# Patient Record
Sex: MALE | Race: WHITE
[De-identification: names, ages, dates, MRNs, and addresses within clinical notes are randomized per-mention and may not be internally consistent; named-entity substitution may affect disease eponyms.]

---

## 2018-07-21 ENCOUNTER — HOSPITAL ENCOUNTER (EMERGENCY)
Dept: HOSPITAL 36 - ER | Age: 58
Discharge: NURSING, REHAB OR LONG-TERM CARE | End: 2018-07-21
Payer: MEDICARE

## 2018-07-21 DIAGNOSIS — K21.9: ICD-10-CM
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
## 2018-07-21 DIAGNOSIS — F03.90: ICD-10-CM
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
## 2018-07-21 DIAGNOSIS — S00.93XA: ICD-10-CM

## 2018-07-21 DIAGNOSIS — Z91.011: ICD-10-CM

## 2018-07-21 DIAGNOSIS — S01.21XA: Primary | ICD-10-CM

## 2018-07-21 LAB
ALBUMIN SERPL-MCNC: 3.6 GM/DL (ref 4.2–5.5)
ALBUMIN/GLOB SERPL: 1.2 {RATIO} (ref 1–1.8)
ALP SERPL-CCNC: 61 U/L (ref 34–104)
ALT SERPL-CCNC: 9 U/L (ref 7–52)
ANION GAP SERPL CALC-SCNC: 9.9 MMOL/L (ref 7–16)
AST SERPL-CCNC: 13 U/L (ref 13–39)
BASOPHILS # BLD AUTO: 0.1 TH/CUMM (ref 0–0.2)
BASOPHILS NFR BLD AUTO: 0.7 % (ref 0–2)
BILIRUB SERPL-MCNC: 0.3 MG/DL (ref 0.3–1)
BUN SERPL-MCNC: 30 MG/DL (ref 7–25)
CALCIUM SERPL-MCNC: 9.2 MG/DL (ref 8.6–10.3)
CHLORIDE SERPL-SCNC: 98 MEQ/L (ref 98–107)
CO2 SERPL-SCNC: 31.8 MEQ/L (ref 21–31)
CREAT SERPL-MCNC: 1.5 MG/DL (ref 0.7–1.3)
EOSINOPHIL # BLD AUTO: 0.2 TH/CMM (ref 0.1–0.4)
EOSINOPHIL NFR BLD AUTO: 2.9 % (ref 0–5)
ERYTHROCYTE [DISTWIDTH] IN BLOOD BY AUTOMATED COUNT: 14.1 % (ref 11.5–20)
GLOBULIN SER-MCNC: 3.1 GM/DL
GLUCOSE SERPL-MCNC: 113 MG/DL (ref 70–105)
HCT VFR BLD CALC: 33.1 % (ref 41–60)
HGB BLD-MCNC: 11.3 GM/DL (ref 12–16)
LYMPHOCYTE AB SER FC-ACNC: 1.5 TH/CMM (ref 1.5–3)
LYMPHOCYTES NFR BLD AUTO: 17.6 % (ref 20–50)
MCH RBC QN AUTO: 28.7 PG (ref 26–30)
MCHC RBC AUTO-ENTMCNC: 34.3 PG (ref 28–36)
MCV RBC AUTO: 83.7 FL (ref 80–99)
MONOCYTES # BLD AUTO: 0.6 TH/CMM (ref 0.3–1)
MONOCYTES NFR BLD AUTO: 7.6 % (ref 2–10)
NEUTROPHILS # BLD: 5.9 TH/CMM (ref 1.8–8)
NEUTROPHILS NFR BLD AUTO: 71.2 % (ref 40–80)
PLATELET # BLD: 227 TH/CMM (ref 150–400)
PMV BLD AUTO: 6.7 FL
POTASSIUM SERPL-SCNC: 3.7 MEQ/L (ref 3.5–5.1)
RBC # BLD AUTO: 3.95 MIL/CMM (ref 4.3–5.7)
SODIUM SERPL-SCNC: 136 MEQ/L (ref 136–145)
WBC # BLD AUTO: 8.3 TH/CMM (ref 4.8–10.8)

## 2018-07-21 PROCEDURE — 70450 CT HEAD/BRAIN W/O DYE: CPT

## 2018-07-21 PROCEDURE — 96372 THER/PROPH/DIAG INJ SC/IM: CPT

## 2018-07-21 PROCEDURE — 80164 ASSAY DIPROPYLACETIC ACD TOT: CPT

## 2018-07-21 PROCEDURE — 84443 ASSAY THYROID STIM HORMONE: CPT

## 2018-07-21 PROCEDURE — 36415 COLL VENOUS BLD VENIPUNCTURE: CPT

## 2018-07-21 PROCEDURE — 90715 TDAP VACCINE 7 YRS/> IM: CPT

## 2018-07-21 PROCEDURE — 96374 THER/PROPH/DIAG INJ IV PUSH: CPT

## 2018-07-21 PROCEDURE — 80299 QUANTITATIVE ASSAY DRUG: CPT

## 2018-07-21 PROCEDURE — 85025 COMPLETE CBC W/AUTO DIFF WBC: CPT

## 2018-07-21 PROCEDURE — 84484 ASSAY OF TROPONIN QUANT: CPT

## 2018-07-21 PROCEDURE — 99285 EMERGENCY DEPT VISIT HI MDM: CPT

## 2018-07-21 PROCEDURE — 80053 COMPREHEN METABOLIC PANEL: CPT

## 2018-07-21 NOTE — ED PHYSICIAN CHART
ED Chief Complaint/HPI





- Patient Information


Date Seen:: 07/21/18


Time Seen:: 16:36


Chief Complaint:: HEAD AND FACE TRAUMA


History of Present Illness:: 





THIS IS A 59 YO MALE CHRONICALLY ILL PATIENT WHO HAS PSYCHOSIS WITH SEIZURES.  

HE FELL FROM A WHEELCHAIR ABOUT TWO HOURS AGO AND INJURED HIS HEAD AND FACE.   

HE DID NOT LOSE CONSCIOUSNESS AND ONLY AN OPEN WOUND OF THE NOSE. 


Allergies:: 


 Allergies











Allergy/AdvReac Type Severity Reaction Status Date / Time


 


lactulose Allergy Unknown  Verified 07/21/18 16:41











Vitals:: 


 Vital Signs - 8 hr











  07/21/18





  16:34


 


Temp 99.3 F


 


HR 92


 


RR 18


 


/93


 


O2 Sat % 96











Historian:: EMS, Medical Records


Review:: Nurse's Note Reviewed, Transfer documents Reviewed





ED Review of Systems





- Review of Systems


General/Constitutional: No fever, No chills, No weight loss, No weakness, No 

diaphoresis, No edema, No loss of appetite, Other (THE PATIENT IS NOT ABLE TO 

GIVE A REVIEW OF SYSTEMS.)


Skin: No skin lesions, No rash, No bruising


Head: No headache, No light-headedness


Eyes: No loss of vision, No pain, No diplopia


ENT: No earache, No nasal drainage, No sore throat, No tinnitus


Neck: No neck pain, No swelling, No thyromegaly, No stiffness, No mass noted


Cardio Vascular: No chest pain, No palpitations, No PND, No orthopnea, No edema


Pulmonary: No SOB, No cough, No sputum, No wheezing


GI: No nausea, No vomiting, No diarrhea, No pain, No melena, No hematochezia, 

No constipation, No hematemesis


G/U: No dysuria, No frequency, No hematuria


Musculoskeletal: No bone or joint pain, No back pain, No muscle pain


Endocrine: No polyuria, No polydipsia


Psychiatric: No prior psych history, No depression, No anxiety, No suicidal 

ideation


Hematopoietic: No bruising, No lymphadenopathy


Allergic/Immuno: No urticaria, No angioedema


Neurological: No syncope, No focal symptoms, No weakness, No paresthesia, No 

headache, No seizure, No dizziness, No confusion, No vertigo





ED Past Medical History





- Past Medical History


Obtainable: Yes


Past Medical History: PUD/GERD, Seizures, Dementia


Family History: None


Social History: Non Smoker, No Alcohol, No Drug Use, Care Facility


Surgical History: None


Psychiatricy History: Schizophrenia, Dementia


Medication: Reviewed





Family Medical History





- Family Member


  ** Father


History Unknown: Yes





ED Physical Exam





- Physical Examination


General/Constitutional: Awake, Well-developed, well-nourished, Alert, No 

distress, GCS 15, Non-toxic appearing, Ambulatory


Head: Atraumatic


Eyes: Lids, conjuctiva normal, PERRL, EOMI


Skin: Nl inspection, No rash, No skin lesions, No ecchymosis, Well hydrated, No 

lymphadenopathy


ENMT: External ears, nose nl, Nasal exam nl, Lips, teeth, gums nl


Other ENMT comments:: 





LACERATION OF THE LEFT SIDE OF THE NOSE THAT IS CLOSED AND NOT BLEEDING WITH 

STRI-STRIPS IN PLACE.


Neck: Nontender, Full ROM w/o pain, No JVD, No nuchal rigidity, No bruit, No 

mass, No stridor


Respiratory: Nl effort/Exclusion, Clear to Auscultation, No Wheeze/Rhonchi/Rales


Cardio Vascular: RRR, No murmur, gallop, rubs, NL S1 S2


GI: No tenderness/rebounding/guarding, No organomegaly, No hernia, Normal BS's, 

Nondistended, No mass/bruits, No McBurney tenderness


: No CVA tenderness


Extremities: No tenderness or effusion, Full ROM, normal strength in all 

extremities, No edema, Normal digits & nails


Neuro/Psych: Alert/oriented, DTR's symmetric, Normal sensory exam, Normal motor 

strength, Judgement/insight normal, Mood normal, Normal gait, No focal deficits


Misc: Normal back, No paraspinal tenderness





ED Labs/Radiology/EKG Results





- Lab Results


Results: 





 Abnormal Lab Results











  07/21/18 07/21/18 07/21/18





  16:42 16:42 16:42


 


WBC  8.3  


 


RBC  3.95 L  


 


Hgb  11.3 L  


 


Hct  33.1 L  


 


MCV  83.7  


 


MCH  28.7  


 


MCHC Differential  34.3  


 


RDW  14.1  


 


Plt Count  227  


 


MPV  6.7  


 


Neutrophils %  71.2  


 


Lymphocytes %  17.6 L  


 


Monocytes %  7.6  


 


Eosinophils %  2.9  


 


Basophils %  0.7  


 


Sodium   136 


 


Potassium   3.7 


 


Chloride   98 


 


Carbon Dioxide   31.8 H 


 


Anion Gap   9.9 


 


BUN   30 H 


 


Creatinine   1.5 H 


 


Est GFR ( Amer)   > 60.0 


 


Est GFR (Non-Af Amer)   50.9 


 


BUN/Creatinine Ratio   20.0 


 


Glucose   113 H 


 


Calcium   9.2 


 


Total Bilirubin   0.3 


 


AST   13 


 


ALT   9 


 


Alkaline Phosphatase   61 


 


Troponin I    < 0.01 L


 


Total Protein   6.7 


 


Albumin   3.6 L 


 


Globulin   3.1 


 


Albumin/Globulin Ratio   1.2 


 


TSH   


 


Valproic Acid   














  07/21/18 07/21/18





  16:42 16:42


 


WBC  


 


RBC  


 


Hgb  


 


Hct  


 


MCV  


 


MCH  


 


MCHC Differential  


 


RDW  


 


Plt Count  


 


MPV  


 


Neutrophils %  


 


Lymphocytes %  


 


Monocytes %  


 


Eosinophils %  


 


Basophils %  


 


Sodium  


 


Potassium  


 


Chloride  


 


Carbon Dioxide  


 


Anion Gap  


 


BUN  


 


Creatinine  


 


Est GFR ( Amer)  


 


Est GFR (Non-Af Amer)  


 


BUN/Creatinine Ratio  


 


Glucose  


 


Calcium  


 


Total Bilirubin  


 


AST  


 


ALT  


 


Alkaline Phosphatase  


 


Troponin I  


 


Total Protein  


 


Albumin  


 


Globulin  


 


Albumin/Globulin Ratio  


 


TSH  2.01 


 


Valproic Acid   60.6














- Radiology Results


Results: 





ct scan of the head = nad





ED Assessment





- Assessment


General Assessment: 





head contusion and lacerated nose





ED Septic Shock





- .


Is Septic Shock (SBP<90, OR Lactate>4 mmol\L) present?: No





- <6hrs of presentation:


Vital Signs: 


 Vital Signs - 8 hr











  07/21/18





  16:34


 


Temp 99.3 F


 


HR 92


 


RR 18


 


/93


 


O2 Sat % 96














ED Reassessment (Disposition)





- Reassessment


Reassessment Condition:: Improved





- Diagnosis


Diagnosis:: 





head contusion


nose laceration


dementia





- Aftercare/Follow up Instructions


Aftercare/Follow-Up Instructions:: Counseled pt regarding lab results/diagnosis 

& need follow up, Refer to Discharge Instructions, Counseled pt & family 

regarding lab results/diagnosis & need follow up





- Patient Disposition


Discharge/Transfer:: Long Term Care - SNF


Condition at Disposition:: Improved

## 2018-07-22 NOTE — DIAGNOSTIC IMAGING REPORT
Exam: CT examination of brain.



HISTORY: Trauma.



Total DLP equals 797



CTDI equals 40.5



Findings:



Multiple contiguous thin section of the brain were obtained from the

base of skull to the vertex without administration of contrast material,

no prior studies available comparison.



The study demonstrates bilateral large subdural hygromas which most

likely represent sequela subdural hematomas.



There is no evidence of edema midline shift.  The digital system is

intact.  Prominence of cerebral sulci and ventricles consistent with

atrophy.



The visualized bony calvarium is intact.  The paranasal sinuses are well

aerated.



IMPRESSION:



Large bilateral subdural hygromas, correlation prior exams would be

helpful.  No evidence for acute hemorrhage midline shift or edema.